# Patient Record
Sex: MALE | Race: WHITE | NOT HISPANIC OR LATINO | URBAN - NONMETROPOLITAN AREA
[De-identification: names, ages, dates, MRNs, and addresses within clinical notes are randomized per-mention and may not be internally consistent; named-entity substitution may affect disease eponyms.]

---

## 2024-02-12 ENCOUNTER — TRANSCRIBE ORDERS (OUTPATIENT)
Dept: ADMINISTRATIVE | Facility: HOSPITAL | Age: 24
End: 2024-02-12

## 2024-02-12 DIAGNOSIS — Z98.2 PRESENCE OF CEREBROSPINAL FLUID DRAINAGE DEVICE: Primary | ICD-10-CM

## 2024-02-22 ENCOUNTER — HOSPITAL ENCOUNTER (OUTPATIENT)
Dept: CT IMAGING | Facility: HOSPITAL | Age: 24
Discharge: HOME | End: 2024-02-22
Admitting: PSYCHIATRY & NEUROLOGY
Payer: MEDICAID

## 2024-02-22 DIAGNOSIS — Z98.2 PRESENCE OF CEREBROSPINAL FLUID DRAINAGE DEVICE: ICD-10-CM

## 2024-02-22 PROCEDURE — 70450 CT HEAD/BRAIN W/O DYE: CPT | Performed by: RADIOLOGY

## 2024-02-22 PROCEDURE — 70450 CT HEAD/BRAIN W/O DYE: CPT

## 2024-12-16 ENCOUNTER — TELEPHONE (OUTPATIENT)
Dept: UROLOGY | Facility: CLINIC | Age: 24
End: 2024-12-16

## 2024-12-16 NOTE — TELEPHONE ENCOUNTER
Caller: DOMITILA HANSEN    Relationship:  Mother    Best call back number: [phone]    PATIENT CALLED REQUESTING TO CANCEL SAME DAY APPT.    Did the patient call AFTER the start time of their scheduled appointment?  []YES  [x]NO    Was the patient's appointment rescheduled? [x]YES  []NO    Any additional information: SICK

## 2025-02-03 ENCOUNTER — OFFICE VISIT (OUTPATIENT)
Dept: UROLOGY | Facility: CLINIC | Age: 25
End: 2025-02-03
Payer: MEDICAID

## 2025-02-03 ENCOUNTER — TELEPHONE (OUTPATIENT)
Dept: UROLOGY | Facility: CLINIC | Age: 25
End: 2025-02-03
Payer: MEDICAID

## 2025-02-03 VITALS — DIASTOLIC BLOOD PRESSURE: 75 MMHG | SYSTOLIC BLOOD PRESSURE: 110 MMHG | HEIGHT: 72 IN

## 2025-02-03 DIAGNOSIS — N31.9 NEUROGENIC BLADDER: Primary | ICD-10-CM

## 2025-02-03 DIAGNOSIS — N39.0 RECURRENT UTI: ICD-10-CM

## 2025-02-03 PROCEDURE — 99203 OFFICE O/P NEW LOW 30 MIN: CPT

## 2025-02-03 PROCEDURE — 1160F RVW MEDS BY RX/DR IN RCRD: CPT

## 2025-02-03 PROCEDURE — 1159F MED LIST DOCD IN RCRD: CPT

## 2025-02-03 RX ORDER — SENNA 8.6 MG/1
2 TABLET, FILM COATED ORAL DAILY
COMMUNITY

## 2025-02-03 RX ORDER — RISPERIDONE 1 MG/1
1 TABLET ORAL 2 TIMES DAILY
COMMUNITY

## 2025-02-03 RX ORDER — NITROFURANTOIN 25; 75 MG/1; MG/1
100 CAPSULE ORAL 2 TIMES DAILY
COMMUNITY
End: 2025-02-06 | Stop reason: SDUPTHER

## 2025-02-03 NOTE — PROGRESS NOTES
"Chief Complaint:    Chief Complaint   Patient presents with    Neuromuscular dysfunction of bladder     New patient       Vital Signs:   /75 (BP Location: Right arm, Patient Position: Sitting, Cuff Size: Adult)   Ht 182.9 cm (72\")   There is no height or weight on file to calculate BMI.      HPI:  Thai Garcia is a 24 y.o. male who presents today for follow up    History of Present Illness  Mr. Garcia presents to the clinic for evaluation of neuromuscular dysfunction of the bladder.  He has a past medical history significant for autism and is currently nonverbal.  He has been referred to us by Keysha ESCOBEDO.  Patient has followed along with Dr. Belle in [facility] for several years now.  Given inability to completely empty he has a history of recurrent urinary tract infection and is currently taking Macrobid 100 mg for prophylaxis.  This was recently increased to twice daily by Dr. Belle over the past few months.  Patient's caregiver reports that she intermittently cath the patient every 6 hours with 12 [place] coudé catheters.  She reports that patient has been urinating on his own at times but very limited amounts.      Past Medical History:  History reviewed. No pertinent past medical history.    Current Meds:  Current Outpatient Medications   Medication Sig Dispense Refill    nitrofurantoin, macrocrystal-monohydrate, (MACROBID) 100 MG capsule Take 1 capsule by mouth 2 (Two) Times a Day.      risperiDONE (risperDAL) 1 MG tablet Take 1 tablet by mouth 2 (Two) Times a Day.      Senna-Tabs 8.6 MG tablet Take 2 tablets by mouth Daily.       No current facility-administered medications for this visit.        Allergies:   No Known Allergies     Past Surgical History:  History reviewed. No pertinent surgical history.    Social History:  Social History     Socioeconomic History    Marital status: Single   Tobacco Use    Smoking status: Never    Smokeless tobacco: Never   Vaping Use    Vaping status: " Never Used       Family History:  History reviewed. No pertinent family history.    Review of Systems:  Review of Systems   Unable to perform ROS: Patient nonverbal   Gastrointestinal:  Positive for constipation.   Genitourinary:  Positive for difficulty urinating.       Physical Exam:  Physical Exam  Constitutional:       General: He is not in acute distress.     Comments: Underdeveloped   HENT:      Head: Normocephalic and atraumatic.      Nose: Nose normal.      Mouth/Throat:      Mouth: Mucous membranes are moist.   Eyes:      Conjunctiva/sclera: Conjunctivae normal.   Cardiovascular:      Rate and Rhythm: Normal rate and regular rhythm.      Pulses: Normal pulses.      Heart sounds: Normal heart sounds.   Pulmonary:      Effort: Pulmonary effort is normal.      Breath sounds: Normal breath sounds.   Abdominal:      General: Bowel sounds are normal.      Palpations: Abdomen is soft.      Comments: No suprapubic tenderness   Musculoskeletal:      Cervical back: Normal range of motion.      Comments: Wheelchair-bound.  Weakness of upper and lower extremities.   Skin:     General: Skin is warm.   Neurological:      Mental Status: He is alert. Mental status is at baseline. He is disoriented.      Cranial Nerves: Cranial nerve deficit present.      Sensory: Sensory deficit present.      Motor: Weakness present.           Recent Image (CT and/or KUB):   CT Abdomen and Pelvis: No results found for this or any previous visit.     CT Stone Protocol: No results found for this or any previous visit.     KUB: No results found for this or any previous visit.       Labs:  Brief Urine Lab Results       None          No visits with results within 3 Month(s) from this visit.   Latest known visit with results is:   No results found for any previous visit.        Procedure: None  Procedures     I have reviewed and agree with the above PMH, PSH, FMH, social history, medications, allergies, and labs.     Assessment/Plan:   Problem  List Items Addressed This Visit       Neurogenic bladder - Primary    Recurrent UTI       Health Maintenance:   Health Maintenance Due   Topic Date Due    INFLUENZA VACCINE  07/01/2024    TDAP/TD VACCINES (2 - Td or Tdap) 08/06/2024    COVID-19 Vaccine (1 - 2024-25 season) Never done    HEPATITIS C SCREENING  Never done    ANNUAL PHYSICAL  Never done        Smoking Counseling: Never smoked.  Never used smokeless tobacco.  Counseling given.    Urine Incontinence: Patient reports that he is experiences symptoms of urinary incontinence..    Patient was given instructions and counseling regarding his condition or for health maintenance advice. Please see specific information pulled into the AVS if appropriate.    Patient Education:   Neurogenic bladder -discussed with the patient's caregiver the pathophysiology of this condition in detail.  Given patient's chronic history of autism with muscular dystrophy this is most likely the source of his neurogenic bladder.  Given his chronic condition I will need to continue with clean intermittent catheterization 6 times daily with 12 [place] Islander coudé's.  Discussed the risk of clean remittent catheterization in detail with the patient's caregiver and she verbalized understanding.  Recurrent UTI -patient's recurrent urinary tract infection as secondary to clean intermittent catheterization as well as incomplete emptying.  Recommend to continue with Macrobid 100 mg prophylactically.  Patient's caregiver reports she will obtain a urine sample and drop this off tomorrow for culture.  I will call her with culture results once available.  Will continue with antibiotics as prescribed by the previous urologist.  Otherwise I will place him back in a year or sooner if needed    Visit Diagnoses:    ICD-10-CM ICD-9-CM   1. Neurogenic bladder  N31.9 596.54   2. Recurrent UTI  N39.0 599.0     A total of 30 minutes were spent coordinating this patient’s care in clinic today; 15 minutes of which  were face-to-face with the patient, reviewing medical history and counseling on the current treatment and followup plan.  All questions were answered to patient's satisfaction.    Meds Ordered During Visit:  No orders of the defined types were placed in this encounter.      Follow Up Appointment: 1 year  No follow-ups on file.      This document has been electronically signed by Daniel Ball PA-C   February 3, 2025 16:19 EST    Part of this note may be an electronic transcription/translation of spoken language to printed text using the Dragon Dictation System.

## 2025-02-03 NOTE — TELEPHONE ENCOUNTER
I called [facility] for the pt to get us to to sign off on his orders. They did have what the pt was needing and will fax it to us and we will send over the office note as well,.

## 2025-02-04 ENCOUNTER — LAB (OUTPATIENT)
Dept: UROLOGY | Facility: CLINIC | Age: 25
End: 2025-02-04
Payer: MEDICAID

## 2025-02-04 DIAGNOSIS — N39.0 RECURRENT UTI: Primary | ICD-10-CM

## 2025-02-04 PROCEDURE — 87086 URINE CULTURE/COLONY COUNT: CPT

## 2025-02-04 PROCEDURE — 87077 CULTURE AEROBIC IDENTIFY: CPT

## 2025-02-04 PROCEDURE — 87186 SC STD MICRODIL/AGAR DIL: CPT

## 2025-02-06 ENCOUNTER — TELEPHONE (OUTPATIENT)
Dept: UROLOGY | Facility: CLINIC | Age: 25
End: 2025-02-06
Payer: MEDICAID

## 2025-02-06 DIAGNOSIS — N39.0 RECURRENT UTI: Primary | ICD-10-CM

## 2025-02-06 LAB — BACTERIA SPEC AEROBE CULT: ABNORMAL

## 2025-02-06 RX ORDER — CEFDINIR 300 MG/1
300 CAPSULE ORAL EVERY 12 HOURS
Qty: 20 CAPSULE | Refills: 0 | Status: SHIPPED | OUTPATIENT
Start: 2025-02-06

## 2025-02-06 RX ORDER — NITROFURANTOIN 25; 75 MG/1; MG/1
100 CAPSULE ORAL NIGHTLY
Qty: 90 CAPSULE | Refills: 3 | Status: SHIPPED | OUTPATIENT
Start: 2025-02-06

## 2025-02-06 NOTE — TELEPHONE ENCOUNTER
Called patient's caregiver and advised her urine culture was positive and recommended start cefdinir 300 mg twice daily for 10 days.  Advised to stop Macrobid at this time and restart once nightly after finishing cefdinir.  She verbalized understanding.